# Patient Record
(demographics unavailable — no encounter records)

---

## 2024-12-01 NOTE — PHYSICAL EXAM
[Normal] : supple, no neck mass and thyroid not enlarged [Normal Neck Lymph Nodes] : normal neck lymph nodes  [Normal Supraclavicular Lymph Nodes] : normal supraclavicular lymph nodes [Normal Groin Lymph Nodes] : normal groin lymph nodes [Normal Axillary Lymph Nodes] : normal axillary lymph nodes [Normal] : oriented to person, place and time, with appropriate affect [de-identified] : no masses or adenopathy bilaterally

## 2024-12-01 NOTE — HISTORY OF PRESENT ILLNESS
[de-identified] : Patient is a 59 y/o F who presents a chief complaint of right breast mass.  S/p intact bilateral breast prosthesis 2014.   11/10/24 sono shows 4 mm calcified shadowing mass right 6:00 N3. (BIRADS 3) 11/10/24 mammo (BIRADS 1)  Denies any family hx of breast cancer.

## 2024-12-01 NOTE — ASSESSMENT
[FreeTextEntry1] : Right breast calcified mass likely fat necrosis or from prior surgery  BIRADS 3 sono 11/2024  Reassured patient that index of suspicion for malignancy is low  Will review images with [facility] Agree with 6 month US 5/2025 RTO 6 months

## 2024-12-01 NOTE — PHYSICAL EXAM
[Normal] : supple, no neck mass and thyroid not enlarged [Normal Neck Lymph Nodes] : normal neck lymph nodes  [Normal Supraclavicular Lymph Nodes] : normal supraclavicular lymph nodes [Normal Groin Lymph Nodes] : normal groin lymph nodes [Normal Axillary Lymph Nodes] : normal axillary lymph nodes [Normal] : oriented to person, place and time, with appropriate affect [de-identified] : no masses or adenopathy bilaterally

## 2024-12-01 NOTE — ADDENDUM
[FreeTextEntry1] : I, Verenice Garnica, acted solely as a scribe for Dr. Chino Matute on this date 11/27/2024.

## 2024-12-01 NOTE — HISTORY OF PRESENT ILLNESS
[de-identified] : Patient is a 61 y/o F who presents a chief complaint of right breast mass.  S/p intact bilateral breast prosthesis 2014.   11/10/24 sono shows 4 mm calcified shadowing mass right 6:00 N3. (BIRADS 3) 11/10/24 mammo (BIRADS 1)  Denies any family hx of breast cancer.

## 2024-12-01 NOTE — CONSULT LETTER
[Dear  ___] : Dear  [unfilled], [Consult Letter:] : I had the pleasure of evaluating your patient, [unfilled]. [Please see my note below.] : Please see my note below. [Sincerely,] : Sincerely, [FreeTextEntry3] : Chino Matute MD FACS

## 2024-12-01 NOTE — ASSESSMENT
[FreeTextEntry1] : Right breast calcified mass likely fat necrosis or from prior surgery  BIRADS 3 sono 11/2024  Reassured patient that index of suspicion for malignancy is low  Will review images with [facility] Agree with 6 month US 5/2025 RTO 6 months